# Patient Record
Sex: FEMALE | Race: WHITE | URBAN - METROPOLITAN AREA
[De-identification: names, ages, dates, MRNs, and addresses within clinical notes are randomized per-mention and may not be internally consistent; named-entity substitution may affect disease eponyms.]

---

## 2018-03-22 ENCOUNTER — TELEPHONE (OUTPATIENT)
Dept: FAMILY MEDICINE CLINIC | Age: 63
End: 2018-03-22

## 2018-04-20 ENCOUNTER — TELEPHONE (OUTPATIENT)
Dept: FAMILY MEDICINE CLINIC | Age: 63
End: 2018-04-20

## 2019-10-17 ENCOUNTER — OFFICE VISIT (OUTPATIENT)
Dept: FAMILY MEDICINE CLINIC | Age: 64
End: 2019-10-17
Payer: COMMERCIAL

## 2019-10-17 VITALS
OXYGEN SATURATION: 98 % | DIASTOLIC BLOOD PRESSURE: 82 MMHG | HEIGHT: 65 IN | SYSTOLIC BLOOD PRESSURE: 138 MMHG | BODY MASS INDEX: 21.13 KG/M2 | WEIGHT: 126.8 LBS | RESPIRATION RATE: 14 BRPM | TEMPERATURE: 98.4 F | HEART RATE: 71 BPM

## 2019-10-17 DIAGNOSIS — R05.9 COUGH: ICD-10-CM

## 2019-10-17 DIAGNOSIS — J06.9 VIRAL URI: Primary | ICD-10-CM

## 2019-10-17 PROCEDURE — 99213 OFFICE O/P EST LOW 20 MIN: CPT | Performed by: NURSE PRACTITIONER

## 2019-10-17 RX ORDER — BENZONATATE 100 MG/1
100 CAPSULE ORAL 3 TIMES DAILY PRN
Qty: 30 CAPSULE | Refills: 0 | Status: SHIPPED | OUTPATIENT
Start: 2019-10-17 | End: 2019-10-24

## 2019-10-17 RX ORDER — ALBUTEROL SULFATE 90 UG/1
2 AEROSOL, METERED RESPIRATORY (INHALATION) EVERY 6 HOURS PRN
Qty: 1 INHALER | Refills: 5 | Status: SHIPPED | OUTPATIENT
Start: 2019-10-17 | End: 2019-10-17 | Stop reason: CLARIF

## 2019-10-17 RX ORDER — ALBUTEROL SULFATE 90 UG/1
2 AEROSOL, METERED RESPIRATORY (INHALATION) EVERY 6 HOURS PRN
Qty: 1 INHALER | Refills: 5 | Status: SHIPPED | OUTPATIENT
Start: 2019-10-17

## 2019-10-17 ASSESSMENT — ENCOUNTER SYMPTOMS
NAUSEA: 0
COUGH: 1
VOMITING: 0
WHEEZING: 0
SHORTNESS OF BREATH: 0
DIARRHEA: 0
SORE THROAT: 1

## 2019-10-17 ASSESSMENT — PATIENT HEALTH QUESTIONNAIRE - PHQ9
SUM OF ALL RESPONSES TO PHQ9 QUESTIONS 1 & 2: 0
1. LITTLE INTEREST OR PLEASURE IN DOING THINGS: 0
SUM OF ALL RESPONSES TO PHQ QUESTIONS 1-9: 0
2. FEELING DOWN, DEPRESSED OR HOPELESS: 0
SUM OF ALL RESPONSES TO PHQ QUESTIONS 1-9: 0

## 2024-08-21 ENCOUNTER — APPOINTMENT (OUTPATIENT)
Dept: GENERAL RADIOLOGY | Age: 69
End: 2024-08-21
Payer: MEDICARE

## 2024-08-21 ENCOUNTER — HOSPITAL ENCOUNTER (EMERGENCY)
Age: 69
Discharge: HOME | End: 2024-08-21
Attending: EMERGENCY MEDICINE
Payer: MEDICARE

## 2024-08-21 ENCOUNTER — TELEPHONE (OUTPATIENT)
Dept: FAMILY MEDICINE CLINIC | Age: 69
End: 2024-08-21

## 2024-08-21 VITALS
DIASTOLIC BLOOD PRESSURE: 97 MMHG | RESPIRATION RATE: 20 BRPM | SYSTOLIC BLOOD PRESSURE: 206 MMHG | BODY MASS INDEX: 18.97 KG/M2 | TEMPERATURE: 97.4 F | WEIGHT: 114 LBS | OXYGEN SATURATION: 100 % | HEART RATE: 110 BPM

## 2024-08-21 DIAGNOSIS — I10 HYPERTENSION, UNSPECIFIED TYPE: Primary | ICD-10-CM

## 2024-08-21 LAB
ANION GAP SERPL CALCULATED.3IONS-SCNC: 11 MMOL/L (ref 7–16)
BUN SERPL-MCNC: 13 MG/DL (ref 6–23)
CALCIUM SERPL-MCNC: 10.3 MG/DL (ref 8.6–10.2)
CHLORIDE SERPL-SCNC: 97 MMOL/L (ref 98–107)
CO2 SERPL-SCNC: 28 MMOL/L (ref 22–29)
CREAT SERPL-MCNC: 0.6 MG/DL (ref 0.5–1)
D-DIMER QUANTITATIVE: <200 NG/ML DDU (ref 0–230)
EKG ATRIAL RATE: 103 BPM
EKG P AXIS: 70 DEGREES
EKG P-R INTERVAL: 134 MS
EKG Q-T INTERVAL: 356 MS
EKG QRS DURATION: 74 MS
EKG QTC CALCULATION (BAZETT): 466 MS
EKG R AXIS: 79 DEGREES
EKG T AXIS: 69 DEGREES
EKG VENTRICULAR RATE: 103 BPM
ERYTHROCYTE [DISTWIDTH] IN BLOOD BY AUTOMATED COUNT: 12.1 % (ref 11.5–15)
GFR, ESTIMATED: >90 ML/MIN/1.73M2
GLUCOSE SERPL-MCNC: 150 MG/DL (ref 74–99)
HCT VFR BLD AUTO: 42 % (ref 34–48)
HGB BLD-MCNC: 14.8 G/DL (ref 11.5–15.5)
MCH RBC QN AUTO: 33.9 PG (ref 26–35)
MCHC RBC AUTO-ENTMCNC: 35.2 G/DL (ref 32–34.5)
MCV RBC AUTO: 96.3 FL (ref 80–99.9)
PLATELET # BLD AUTO: 243 K/UL (ref 130–450)
PMV BLD AUTO: 10 FL (ref 7–12)
POTASSIUM SERPL-SCNC: 4 MMOL/L (ref 3.5–5)
RBC # BLD AUTO: 4.36 M/UL (ref 3.5–5.5)
SODIUM SERPL-SCNC: 136 MMOL/L (ref 132–146)
TROPONIN I SERPL HS-MCNC: 7 NG/L (ref 0–9)
TSH SERPL DL<=0.05 MIU/L-ACNC: 4.06 UIU/ML (ref 0.27–4.2)
WBC OTHER # BLD: 7.2 K/UL (ref 4.5–11.5)

## 2024-08-21 PROCEDURE — 6370000000 HC RX 637 (ALT 250 FOR IP): Performed by: EMERGENCY MEDICINE

## 2024-08-21 PROCEDURE — 84443 ASSAY THYROID STIM HORMONE: CPT

## 2024-08-21 PROCEDURE — 96360 HYDRATION IV INFUSION INIT: CPT

## 2024-08-21 PROCEDURE — 96361 HYDRATE IV INFUSION ADD-ON: CPT

## 2024-08-21 PROCEDURE — 84484 ASSAY OF TROPONIN QUANT: CPT

## 2024-08-21 PROCEDURE — 2580000003 HC RX 258: Performed by: EMERGENCY MEDICINE

## 2024-08-21 PROCEDURE — 85379 FIBRIN DEGRADATION QUANT: CPT

## 2024-08-21 PROCEDURE — 71046 X-RAY EXAM CHEST 2 VIEWS: CPT

## 2024-08-21 PROCEDURE — 80048 BASIC METABOLIC PNL TOTAL CA: CPT

## 2024-08-21 PROCEDURE — 85027 COMPLETE CBC AUTOMATED: CPT

## 2024-08-21 PROCEDURE — 93005 ELECTROCARDIOGRAM TRACING: CPT | Performed by: EMERGENCY MEDICINE

## 2024-08-21 PROCEDURE — 99285 EMERGENCY DEPT VISIT HI MDM: CPT

## 2024-08-21 PROCEDURE — 93010 ELECTROCARDIOGRAM REPORT: CPT | Performed by: INTERNAL MEDICINE

## 2024-08-21 RX ORDER — AMLODIPINE BESYLATE 5 MG/1
5 TABLET ORAL DAILY
Qty: 30 TABLET | Refills: 0 | Status: SHIPPED | OUTPATIENT
Start: 2024-08-21

## 2024-08-21 RX ORDER — AMLODIPINE BESYLATE 5 MG/1
5 TABLET ORAL ONCE
Status: COMPLETED | OUTPATIENT
Start: 2024-08-21 | End: 2024-08-21

## 2024-08-21 RX ORDER — 0.9 % SODIUM CHLORIDE 0.9 %
1000 INTRAVENOUS SOLUTION INTRAVENOUS ONCE
Status: COMPLETED | OUTPATIENT
Start: 2024-08-21 | End: 2024-08-21

## 2024-08-21 RX ADMIN — AMLODIPINE BESYLATE 5 MG: 5 TABLET ORAL at 19:48

## 2024-08-21 RX ADMIN — SODIUM CHLORIDE 1000 ML: 9 INJECTION, SOLUTION INTRAVENOUS at 16:30

## 2024-08-21 ASSESSMENT — LIFESTYLE VARIABLES
HOW MANY STANDARD DRINKS CONTAINING ALCOHOL DO YOU HAVE ON A TYPICAL DAY: 1 OR 2
HOW OFTEN DO YOU HAVE A DRINK CONTAINING ALCOHOL: 4 OR MORE TIMES A WEEK

## 2024-08-21 ASSESSMENT — ENCOUNTER SYMPTOMS
DIARRHEA: 0
SHORTNESS OF BREATH: 0
VOMITING: 0
COUGH: 0
NAUSEA: 0
ABDOMINAL PAIN: 0

## 2024-08-21 ASSESSMENT — PAIN - FUNCTIONAL ASSESSMENT: PAIN_FUNCTIONAL_ASSESSMENT: NONE - DENIES PAIN

## 2024-08-21 NOTE — TELEPHONE ENCOUNTER
Patient experiencing evaluated blood pressure. She is getting readings in the 200s then it went down to180/100 now is currently at 188/96. Patient experiencing heart rate is elevated. She mentioned she can feel that it is elevated. Patient has not been seen in our office since 10/2019 and because of this she is no longer our patient. I advised patient she should be seen at urgent care or emergency room for further evaluation. Patient verbalized understanding at this time.

## 2024-08-21 NOTE — ED PROVIDER NOTES
Patient presents to department with complaint of palpitations and elevated blood pressures today.  Patient states that she is a very healthy individual and has not have or had any issues with high blood pressure or palpitations in the past.  Patient states that she was nirmala vegetables today when she noticed that her heart was racing.  She denies any chest pain or shortness of breath.  No lightheadedness, nausea, diaphoresis or peripheral edema.  Patient does not have any history of thyroid illness.  She states that she does not regularly see her doctor as she is typically very healthy.  She did take aspirin prior to arrival here.    The history is provided by the patient.       Review of Systems   Constitutional:  Negative for activity change, chills, diaphoresis, fatigue and fever.   HENT: Negative.     Respiratory:  Negative for cough and shortness of breath.    Cardiovascular:  Positive for palpitations. Negative for chest pain and leg swelling.   Gastrointestinal:  Negative for abdominal pain, diarrhea, nausea and vomiting.   Genitourinary: Negative.    Skin: Negative.    Neurological:  Negative for weakness, light-headedness, numbness and headaches.   Psychiatric/Behavioral:  The patient is not nervous/anxious.    All other systems reviewed and are negative.      Physical Exam  Vitals and nursing note reviewed.   Constitutional:       General: She is not in acute distress.     Appearance: Normal appearance. She is well-developed and normal weight. She is not ill-appearing, toxic-appearing or diaphoretic.   HENT:      Head: Normocephalic and atraumatic.      Nose: Nose normal.      Mouth/Throat:      Mouth: Mucous membranes are moist.      Pharynx: Oropharynx is clear.   Eyes:      Extraocular Movements: Extraocular movements intact.      Conjunctiva/sclera: Conjunctivae normal.      Pupils: Pupils are equal, round, and reactive to light.   Cardiovascular:      Rate and Rhythm: Regular rhythm. Tachycardia

## 2024-08-23 ENCOUNTER — OFFICE VISIT (OUTPATIENT)
Dept: FAMILY MEDICINE CLINIC | Age: 69
End: 2024-08-23

## 2024-08-23 VITALS
BODY MASS INDEX: 18.83 KG/M2 | HEART RATE: 87 BPM | TEMPERATURE: 98.1 F | SYSTOLIC BLOOD PRESSURE: 150 MMHG | OXYGEN SATURATION: 98 % | DIASTOLIC BLOOD PRESSURE: 96 MMHG | HEIGHT: 65 IN | RESPIRATION RATE: 16 BRPM | WEIGHT: 113 LBS

## 2024-08-23 DIAGNOSIS — Z12.11 COLON CANCER SCREENING: ICD-10-CM

## 2024-08-23 DIAGNOSIS — Z13.220 LIPID SCREENING: ICD-10-CM

## 2024-08-23 DIAGNOSIS — I10 HYPERTENSION, UNSPECIFIED TYPE: ICD-10-CM

## 2024-08-23 DIAGNOSIS — Z12.31 ENCOUNTER FOR SCREENING MAMMOGRAM FOR MALIGNANT NEOPLASM OF BREAST: ICD-10-CM

## 2024-08-23 DIAGNOSIS — Z76.89 ENCOUNTER TO ESTABLISH CARE: Primary | ICD-10-CM

## 2024-08-23 DIAGNOSIS — E55.9 VITAMIN D DEFICIENCY: ICD-10-CM

## 2024-08-23 DIAGNOSIS — R73.09 ELEVATED GLUCOSE: ICD-10-CM

## 2024-08-23 SDOH — ECONOMIC STABILITY: FOOD INSECURITY: WITHIN THE PAST 12 MONTHS, YOU WORRIED THAT YOUR FOOD WOULD RUN OUT BEFORE YOU GOT MONEY TO BUY MORE.: NEVER TRUE

## 2024-08-23 SDOH — ECONOMIC STABILITY: INCOME INSECURITY: HOW HARD IS IT FOR YOU TO PAY FOR THE VERY BASICS LIKE FOOD, HOUSING, MEDICAL CARE, AND HEATING?: NOT HARD AT ALL

## 2024-08-23 SDOH — ECONOMIC STABILITY: FOOD INSECURITY: WITHIN THE PAST 12 MONTHS, THE FOOD YOU BOUGHT JUST DIDN'T LAST AND YOU DIDN'T HAVE MONEY TO GET MORE.: NEVER TRUE

## 2024-08-23 ASSESSMENT — ENCOUNTER SYMPTOMS
WHEEZING: 0
CONSTIPATION: 0
NAUSEA: 0
COUGH: 0
SHORTNESS OF BREATH: 0
DIARRHEA: 0
VOMITING: 0

## 2024-08-23 ASSESSMENT — PATIENT HEALTH QUESTIONNAIRE - PHQ9
1. LITTLE INTEREST OR PLEASURE IN DOING THINGS: NOT AT ALL
SUM OF ALL RESPONSES TO PHQ9 QUESTIONS 1 & 2: 0
SUM OF ALL RESPONSES TO PHQ QUESTIONS 1-9: 0
2. FEELING DOWN, DEPRESSED OR HOPELESS: NOT AT ALL

## 2024-08-23 NOTE — PROGRESS NOTES
Janice Martines (:  1955) is a 69 y.o. female,New patient, here for evaluation of the following chief complaint(s):  Establish Care (Light headed, palpitations, BP has been running high: Went to ER 2 days ago due to high BP, was given amlodipine)      Assessment & Plan   ASSESSMENT/PLAN:  1. Encounter to establish care  2. Colon cancer screening  -     Cologuard (Fecal DNA Colorectal Cancer Screening)  3. Encounter for screening mammogram for malignant neoplasm of breast  -     VISH DIGITAL SCREEN BILATERAL PER PROTOCOL; Future  4. Hypertension, unspecified type  -     Comprehensive Metabolic Panel; Future  Continue amlodipine 5 mg/day  Monitor BP twice daily at home and send readings via [employer]  5. Elevated glucose  -     Comprehensive Metabolic Panel; Future  -     Hemoglobin A1C; Future  6. Lipid screening  -     Lipid Panel; Future  7. Vitamin D deficiency  -     Vitamin D 25 Hydroxy; Future    Schedule AWV in 4-6 weeks    No follow-ups on file.         Subjective   SUBJECTIVE/OBJECTIVE:  Patient is here for re establish care.  [facility]'s ER on  due to rapid heart beat. Diagnosed with HTN.  She is has been monitoring her BP since ER was 135 systolic and increased to 180 after medication and eating.  She stopped caffeine and alcohol completely after ER visit        Review of Systems   Constitutional:  Negative for activity change, appetite change, fatigue and unexpected weight change.   Respiratory:  Negative for cough, shortness of breath and wheezing.    Cardiovascular:  Negative for chest pain, palpitations and leg swelling.   Gastrointestinal:  Negative for constipation, diarrhea, nausea and vomiting.   Endocrine: Negative for polydipsia, polyphagia and polyuria.   Neurological:  Negative for weakness, light-headedness, numbness and headaches.   Psychiatric/Behavioral:  Negative for dysphoric mood and sleep disturbance. The patient is not nervous/anxious.           Objective   BP (!) 150/96

## 2024-08-28 DIAGNOSIS — E55.9 VITAMIN D DEFICIENCY: ICD-10-CM

## 2024-08-28 DIAGNOSIS — R73.09 ELEVATED GLUCOSE: ICD-10-CM

## 2024-08-28 DIAGNOSIS — Z13.220 LIPID SCREENING: ICD-10-CM

## 2024-08-28 DIAGNOSIS — I10 HYPERTENSION, UNSPECIFIED TYPE: ICD-10-CM

## 2024-08-28 LAB
ALBUMIN: 4.4 G/DL (ref 3.5–5.2)
ALP BLD-CCNC: 74 U/L (ref 35–104)
ALT SERPL-CCNC: 17 U/L (ref 0–32)
ANION GAP SERPL CALCULATED.3IONS-SCNC: 13 MMOL/L (ref 7–16)
AST SERPL-CCNC: 23 U/L (ref 0–31)
BILIRUB SERPL-MCNC: 0.4 MG/DL (ref 0–1.2)
BUN BLDV-MCNC: 15 MG/DL (ref 6–23)
CALCIUM SERPL-MCNC: 9.9 MG/DL (ref 8.6–10.2)
CHLORIDE BLD-SCNC: 102 MMOL/L (ref 98–107)
CHOLESTEROL, TOTAL: 198 MG/DL
CO2: 27 MMOL/L (ref 22–29)
CREAT SERPL-MCNC: 0.6 MG/DL (ref 0.5–1)
GFR, ESTIMATED: >90 ML/MIN/1.73M2
GLUCOSE BLD-MCNC: 98 MG/DL (ref 74–99)
HBA1C MFR BLD: 5.5 % (ref 4–5.6)
HDLC SERPL-MCNC: 68 MG/DL
LDL CHOLESTEROL: 122 MG/DL
POTASSIUM SERPL-SCNC: 4.6 MMOL/L (ref 3.5–5)
SODIUM BLD-SCNC: 142 MMOL/L (ref 132–146)
TOTAL PROTEIN: 7.2 G/DL (ref 6.4–8.3)
TRIGL SERPL-MCNC: 41 MG/DL
VITAMIN D 25-HYDROXY: 73.3 NG/ML (ref 30–100)
VLDLC SERPL CALC-MCNC: 8 MG/DL

## 2024-09-04 LAB — NONINV COLON CA DNA+OCC BLD SCRN STL QL: NEGATIVE

## 2024-09-25 ENCOUNTER — OFFICE VISIT (OUTPATIENT)
Dept: FAMILY MEDICINE CLINIC | Age: 69
End: 2024-09-25
Payer: MEDICARE

## 2024-09-25 VITALS
TEMPERATURE: 98.3 F | WEIGHT: 110.6 LBS | BODY MASS INDEX: 18.43 KG/M2 | DIASTOLIC BLOOD PRESSURE: 82 MMHG | RESPIRATION RATE: 16 BRPM | OXYGEN SATURATION: 98 % | HEART RATE: 87 BPM | SYSTOLIC BLOOD PRESSURE: 130 MMHG | HEIGHT: 65 IN

## 2024-09-25 DIAGNOSIS — Z00.00 WELCOME TO MEDICARE PREVENTIVE VISIT: Primary | ICD-10-CM

## 2024-09-25 DIAGNOSIS — R00.2 PALPITATIONS: ICD-10-CM

## 2024-09-25 DIAGNOSIS — J30.9 ALLERGIC RHINITIS, UNSPECIFIED SEASONALITY, UNSPECIFIED TRIGGER: ICD-10-CM

## 2024-09-25 PROCEDURE — G0402 INITIAL PREVENTIVE EXAM: HCPCS | Performed by: NURSE PRACTITIONER

## 2024-09-25 PROCEDURE — 3017F COLORECTAL CA SCREEN DOC REV: CPT | Performed by: NURSE PRACTITIONER

## 2024-09-25 PROCEDURE — 1123F ACP DISCUSS/DSCN MKR DOCD: CPT | Performed by: NURSE PRACTITIONER

## 2024-09-25 RX ORDER — AZELASTINE 1 MG/ML
2 SPRAY, METERED NASAL 2 TIMES DAILY
Qty: 120 ML | Refills: 1 | Status: SHIPPED | OUTPATIENT
Start: 2024-09-25

## 2024-09-25 ASSESSMENT — LIFESTYLE VARIABLES
HOW MANY STANDARD DRINKS CONTAINING ALCOHOL DO YOU HAVE ON A TYPICAL DAY: PATIENT DOES NOT DRINK
HOW OFTEN DO YOU HAVE A DRINK CONTAINING ALCOHOL: NEVER

## 2024-09-25 ASSESSMENT — PATIENT HEALTH QUESTIONNAIRE - PHQ9
SUM OF ALL RESPONSES TO PHQ QUESTIONS 1-9: 0
SUM OF ALL RESPONSES TO PHQ QUESTIONS 1-9: 0
SUM OF ALL RESPONSES TO PHQ9 QUESTIONS 1 & 2: 0
SUM OF ALL RESPONSES TO PHQ QUESTIONS 1-9: 0
1. LITTLE INTEREST OR PLEASURE IN DOING THINGS: NOT AT ALL
2. FEELING DOWN, DEPRESSED OR HOPELESS: NOT AT ALL
SUM OF ALL RESPONSES TO PHQ QUESTIONS 1-9: 0

## 2024-09-26 RX ORDER — AMLODIPINE BESYLATE 5 MG/1
5 TABLET ORAL DAILY
Qty: 90 TABLET | Refills: 0 | Status: SHIPPED | OUTPATIENT
Start: 2024-09-26

## 2024-10-21 ENCOUNTER — TELEPHONE (OUTPATIENT)
Dept: FAMILY MEDICINE CLINIC | Age: 69
End: 2024-10-21

## 2024-10-21 DIAGNOSIS — J30.9 ALLERGIC RHINITIS, UNSPECIFIED SEASONALITY, UNSPECIFIED TRIGGER: Primary | ICD-10-CM

## 2024-10-21 NOTE — TELEPHONE ENCOUNTER
Janice is still having sinus issues and wonders if you'd recommend an ENT for her ?    Please advise.

## 2024-10-29 DIAGNOSIS — R00.2 PALPITATIONS: ICD-10-CM

## 2024-12-26 RX ORDER — AMLODIPINE BESYLATE 5 MG/1
5 TABLET ORAL DAILY
Qty: 90 TABLET | Refills: 0 | Status: SHIPPED | OUTPATIENT
Start: 2024-12-26

## 2024-12-26 NOTE — TELEPHONE ENCOUNTER
Name of Medication(s) Requested:  Requested Prescriptions     Pending Prescriptions Disp Refills    amLODIPine (NORVASC) 5 MG tablet [Pharmacy Med Name: AMLODIPINE BESYLATE 5MG TABLETS] 90 tablet 0     Sig: TAKE 1 TABLET BY MOUTH DAILY       Medication is on current medication list Yes    Dosage and directions were verified? Yes    Quantity verified: 90 day supply     Pharmacy Verified?  Yes    Last Appointment:  Visit date not found    Future appts:  No future appointments.     (If no appt send self scheduling link. .REFILLAPPT)  Scheduling request sent?     [] Yes  [] No    Does patient need updated?  [] Yes  [] No

## 2025-03-24 RX ORDER — AMLODIPINE BESYLATE 5 MG/1
5 TABLET ORAL DAILY
Qty: 90 TABLET | Refills: 0 | Status: SHIPPED | OUTPATIENT
Start: 2025-03-24

## 2025-03-24 NOTE — TELEPHONE ENCOUNTER
Name of Medication(s) Requested:  Requested Prescriptions     Pending Prescriptions Disp Refills    amLODIPine (NORVASC) 5 MG tablet [Pharmacy Med Name: AMLODIPINE BESYLATE 5MG TABLETS] 90 tablet 0     Sig: TAKE 1 TABLET BY MOUTH DAILY       Medication is on current medication list Yes    Dosage and directions were verified? Yes    Quantity verified: 90 day supply     Pharmacy Verified?  Yes    Last Appointment:  Visit date not found    Future appts:  No future appointments.     (If no appt send self scheduling link. .REFILLAPPT)  Scheduling request sent?     [] Yes  [x] No    Does patient need updated?  [] Yes  [x] No

## 2025-06-30 RX ORDER — AMLODIPINE BESYLATE 5 MG/1
5 TABLET ORAL DAILY
Qty: 90 TABLET | Refills: 0 | Status: SHIPPED | OUTPATIENT
Start: 2025-06-30

## 2025-06-30 NOTE — TELEPHONE ENCOUNTER
//Name of Medication(s) Requested:  Requested Prescriptions     Pending Prescriptions Disp Refills    amLODIPine (NORVASC) 5 MG tablet [Pharmacy Med Name: AMLODIPINE BESYLATE 5MG TABLETS] 90 tablet 0     Sig: TAKE 1 TABLET BY MOUTH DAILY       Medication is on current medication list Yes    Dosage and directions were verified? Yes    Quantity verified: 90 day supply     Pharmacy Verified?  Yes    Last Appointment:  9/25/24  Future appts:        (If no appt send self scheduling link. .REFILLAPPT)  Scheduling request sent?     [] Yes  [] No    Does patient need updated?  [] Yes  [] No